# Patient Record
(demographics unavailable — no encounter records)

---

## 2024-11-04 NOTE — PHYSICAL EXAM
[No Acute Distress] : no acute distress [Normal Sclera/Conjunctiva] : normal sclera/conjunctiva [Normal Outer Ear/Nose] : the outer ears and nose were normal in appearance [No Lymphadenopathy] : no lymphadenopathy [No Respiratory Distress] : no respiratory distress  [No Accessory Muscle Use] : no accessory muscle use [No Edema] : there was no peripheral edema [No Rash] : no rash [No Skin Lesions] : no skin lesions [Normal] : affect was normal and insight and judgment were intact

## 2024-11-04 NOTE — HISTORY OF PRESENT ILLNESS
[Home] : at home, [unfilled] , at the time of the visit. [Other Location: e.g. Home (Enter Location, City,State)___] : at [unfilled] [Other:____] : [unfilled] [Verbal consent obtained from patient] : the patient, [unfilled] [FreeTextEntry1] : F/u post hospitalization at [facility] from 10/21-28 for AHRF [de-identified] : This patient is Enrolled in the Post-Discharge [facility] Follow Up Program through [facility] for Continuity of Care S/P Recent Hospitalization until seen by PCP.  Ms. Chan is a 66 yo F with a PMH of asthma, COPD on 2 L oxygen at home, BIBIANA, HTN, DM II, CKD3b, Stage 2 pressure injury, Graves disease, HFpEF, chronic lower back pain with multiple spinal surgeries, anxiety, s/p cholecystectomy, presented for SOB after stopping her Lasix for 4d due to increased urine output. She was SOB and called 911 and was found to be 88% on RA by EMS and placed on NRB. She was given IV Lasix in the ED and placed on BIPAP. She is admitted for further management.  Televisit with pt and RN Cecelia SCRUGGS. Pt is alert and oriented x4, in no acute distress, able to speak in full sentences.  Pt reports O2 concentrator has not been working since Thursday. She has a loaner [employer] from her friend but will need to return it as friend is also ill. RN trouble shooting concentrator but still no success. Pt has had machine for the past 5 yrs. Call to [facility] by RN - pt will need to f/u with PULM for new prescription. RN was able to secure appt for 11/6.  Pt was dc with NIV, however, she has not been shown how to use it so as not been using it. She has training scheduled for Thursday. She denies any changes in her sleep at nights. Reports she is feeling well, cough with clear phlegm Prednisone helps taper now at 3 tabs will be at 2 tabs tomorrow for 3 dys.  Pt is a smoker - discussed safe smoking - not with O2, not in bed.  Pt instructed to go back to the ER if O2 issues are not resolved.  Has PCP scheduled for next Wednesday.   TCM NP reviewed that pt is under the [facility] program for home care until pt is seen by PCP. TCM NP will be assigned to sign Home Care orders post-discharge

## 2024-11-04 NOTE — ASSESSMENT
[FreeTextEntry1] : You were admitted to acute hypoxemic respiratory failure due to a combination of COPD exacerbation and acute decompensated heart failure. You were given intravenous diuretics and steroids that were later switched to an oral formulation after improvement in your clinical picture. Please continue taking your steroid taper as prescribed, continue oxygen, NIV when respiratory therapist visit. Keep all f/u appts -CARDS 11/6, PULM 11/6 -for O2 orders and PCP next Wednesday.

## 2024-11-04 NOTE — PLAN
[FreeTextEntry1] : 1. cont blood glucose monitoring daily  2. continue all medications as prescribed 3. f/u with CARDS/PULM 11/6, PCP next Wednesday 4. maintain diabetic, DASH diet 5. keep appt with respiratory therapist 11/7 for NIV training  6.  maintain fall precaution 7. go to the ER if she still does not have oxygen

## 2024-11-04 NOTE — REVIEW OF SYSTEMS
[Cough] : cough [Incontinence] : incontinence [Unsteady Walking] : ataxia [Insomnia] : insomnia [Negative] : Heme/Lymph [Fever] : no fever [Chills] : no chills [Night Sweats] : no night sweats [Hearing Loss] : no hearing loss [Chest Pain] : no chest pain [Lower Ext Edema] : no lower extremity edema [Shortness Of Breath] : no shortness of breath [Nausea] : no nausea [Constipation] : no constipation [Vomiting] : no vomiting [Joint Pain] : no joint pain [Joint Stiffness] : no joint stiffness [Muscle Pain] : no muscle pain [Itching] : no itching [Skin Rash] : no skin rash

## 2024-11-06 NOTE — HISTORY OF PRESENT ILLNESS
[TextBox_4] : SEVERE COPD STILL ACTIVELY SMOKING SP RECENT ADMISSION FOR PUL EDEMA/ COPD EXACERBATION ON TRIPLE THERAPY BIBIANA NOT COMPLIANT/ DC ON NIV PE ON CHEST CT 2/24

## 2024-11-06 NOTE — PHYSICAL EXAM
[No Acute Distress] : no acute distress [IV] : Mallampati Class: IV [Normal Appearance] : normal appearance [No Neck Mass] : no neck mass [Normal Rate/Rhythm] : normal rate/rhythm [Normal S1, S2] : normal s1, s2 [No Murmurs] : no murmurs [No Abnormalities] : no abnormalities [Benign] : benign [Normal Gait] : normal gait [No Clubbing] : no clubbing [No Cyanosis] : no cyanosis [No Edema] : no edema [FROM] : FROM [Normal Color/ Pigmentation] : normal color/ pigmentation [No Focal Deficits] : no focal deficits [Oriented x3] : oriented x3 [Normal Affect] : normal affect [TextBox_68] : DEC BS BOTH BASES

## 2024-11-06 NOTE — DISCUSSION/SUMMARY
[FreeTextEntry1] : SEVERE COPD SP EXACERBATION / PUL EDEMA CHRONIC HYPERCAPNIC RESP FAILURE ON NIV HER POX WAS 88% RA AT REST PATIENT MOBILE WITHIN HER HOUSE ORDER INOGEN LIKE OXYGEN BL PE ON ELIQUIS REPEAT CT ACTIVE SMOKER BIBIANA NOT COMPLIANT WEIGHT LOSS PE WAS 2/2024

## 2024-12-12 NOTE — DISCUSSION/SUMMARY
[FreeTextEntry1] : SEVERE COPD SP EXACERBATION / PUL EDEMA NOW EXACERBATION CHRONIC HYPERCAPNIC RESP FAILURE ON NIV HER POX WAS 88% RA AT REST PATIENT MOBILE WITHIN HER HOUSE ORDER INOGEN LIKE OXYGEN BL PE ON ELIQUIS ACTIVE SMOKER BIBIANA COUNSELED ABOUT COMPLIANCE WEIGHT LOSS PE WAS 2/2024